# Patient Record
Sex: MALE | Race: WHITE | NOT HISPANIC OR LATINO | Employment: OTHER | URBAN - METROPOLITAN AREA
[De-identification: names, ages, dates, MRNs, and addresses within clinical notes are randomized per-mention and may not be internally consistent; named-entity substitution may affect disease eponyms.]

---

## 2020-01-22 ENCOUNTER — EXTERNAL HOSPITAL ADMISSION (OUTPATIENT)
Dept: ADMINISTRATIVE | Facility: CLINIC | Age: 85
End: 2020-01-22

## 2020-01-22 ENCOUNTER — PATIENT OUTREACH (OUTPATIENT)
Dept: ADMINISTRATIVE | Facility: CLINIC | Age: 85
End: 2020-01-22

## 2020-01-22 NOTE — TELEPHONE ENCOUNTER
C3 nurse attempted to contact patient. No answer.  C3 nurse attempted to contact Dax Jean-Baptiste  for a TCC post hospital discharge follow up call. No answer at phone number listed and no voicemail available. The patient does not have a scheduled HOSFU appointment within 7-14 days post hospital discharge date 1/20/20.